# Patient Record
Sex: FEMALE | Race: WHITE | NOT HISPANIC OR LATINO
[De-identification: names, ages, dates, MRNs, and addresses within clinical notes are randomized per-mention and may not be internally consistent; named-entity substitution may affect disease eponyms.]

---

## 2019-08-27 ENCOUNTER — TELEPHONE (OUTPATIENT)
Dept: SCHEDULING | Age: 84
End: 2019-08-27

## 2019-09-09 ENCOUNTER — TELEPHONE (OUTPATIENT)
Dept: SCHEDULING | Age: 84
End: 2019-09-09

## 2019-10-21 ENCOUNTER — TELEPHONE (OUTPATIENT)
Dept: SCHEDULING | Age: 84
End: 2019-10-21

## 2019-10-25 ENCOUNTER — TELEPHONE (OUTPATIENT)
Dept: INTERNAL MEDICINE | Age: 84
End: 2019-10-25

## 2019-10-25 RX ORDER — ALENDRONATE SODIUM 70 MG/1
TABLET ORAL
Refills: 3 | COMMUNITY
Start: 2019-09-14 | End: 2019-10-25 | Stop reason: SDUPTHER

## 2019-10-25 RX ORDER — ALENDRONATE SODIUM 70 MG/1
70 TABLET ORAL
Qty: 12 TABLET | Refills: 3 | Status: SHIPPED | OUTPATIENT
Start: 2019-10-25

## 2019-10-29 PROBLEM — M81.0 OSTEOPOROSIS, POSTMENOPAUSAL: Status: ACTIVE | Noted: 2019-10-29

## 2019-10-30 ENCOUNTER — OFFICE VISIT (OUTPATIENT)
Dept: INTERNAL MEDICINE | Age: 84
End: 2019-10-30

## 2019-10-30 DIAGNOSIS — Z23 INFLUENZA VACCINE NEEDED: ICD-10-CM

## 2019-10-30 DIAGNOSIS — M15.9 GENERALIZED OSTEOARTHRITIS: ICD-10-CM

## 2019-10-30 DIAGNOSIS — I10 ESSENTIAL HYPERTENSION, BENIGN: ICD-10-CM

## 2019-10-30 DIAGNOSIS — M75.02 ADHESIVE CAPSULITIS OF LEFT SHOULDER: Primary | ICD-10-CM

## 2019-10-30 PROBLEM — M19.211: Status: ACTIVE | Noted: 2018-11-20

## 2019-10-30 PROBLEM — M19.212: Status: ACTIVE | Noted: 2018-11-20

## 2019-10-30 PROCEDURE — 99203 OFFICE O/P NEW LOW 30 MIN: CPT | Performed by: INTERNAL MEDICINE

## 2019-10-30 PROCEDURE — G0008 ADMIN INFLUENZA VIRUS VAC: HCPCS

## 2019-10-30 PROCEDURE — 90662 IIV NO PRSV INCREASED AG IM: CPT

## 2019-10-30 RX ORDER — HYDROCHLOROTHIAZIDE 12.5 MG/1
TABLET ORAL
Refills: 0 | COMMUNITY
Start: 2019-10-21

## 2019-10-30 RX ORDER — SENNOSIDES 8.6 MG
1300 CAPSULE ORAL 2 TIMES DAILY
Qty: 30 TABLET | COMMUNITY
Start: 2019-10-30 | End: 2019-10-30 | Stop reason: SDUPTHER

## 2019-10-30 RX ORDER — SENNOSIDES 8.6 MG
CAPSULE ORAL
Qty: 30 TABLET | COMMUNITY
Start: 2019-10-30

## 2019-10-30 RX ORDER — SENNOSIDES 8.6 MG
1300 CAPSULE ORAL
COMMUNITY
Start: 2017-04-13 | End: 2019-10-30 | Stop reason: SDUPTHER

## 2019-10-30 RX ORDER — MULTIVITAMIN
TABLET,CHEWABLE ORAL
COMMUNITY
Start: 2007-04-16

## 2019-10-30 RX ORDER — LOSARTAN POTASSIUM 100 MG/1
TABLET ORAL
Refills: 0 | COMMUNITY
Start: 2019-10-21

## 2019-10-30 ASSESSMENT — COGNITIVE AND FUNCTIONAL STATUS - GENERAL
DO YOU HAVE DIFFICULTY DRESSING OR BATHING: NO
BECAUSE OF A PHYSICAL, MENTAL, OR EMOTIONAL CONDITION, DO YOU HAVE DIFFICULTY DOING ERRANDS ALONE: NO
DO YOU HAVE SERIOUS DIFFICULTY WALKING OR CLIMBING STAIRS: YES
BECAUSE OF A PHYSICAL, MENTAL, OR EMOTIONAL CONDITION, DO YOU HAVE SERIOUS DIFFICULTY CONCENTRATING, REMEMBERING OR MAKING DECISIONS: NO

## 2019-10-30 ASSESSMENT — ENCOUNTER SYMPTOMS
PSYCHIATRIC NEGATIVE: 1
EYES NEGATIVE: 1
GASTROINTESTINAL NEGATIVE: 1
NEUROLOGICAL NEGATIVE: 1
RESPIRATORY NEGATIVE: 1
HEMATOLOGIC/LYMPHATIC NEGATIVE: 1
ENDOCRINE NEGATIVE: 1
CONSTITUTIONAL NEGATIVE: 1
ALLERGIC/IMMUNOLOGIC NEGATIVE: 1

## 2019-10-30 ASSESSMENT — PAIN SCALES - GENERAL: PAINLEVEL: 0

## 2019-11-06 ENCOUNTER — TELEPHONE (OUTPATIENT)
Dept: SCHEDULING | Age: 84
End: 2019-11-06

## 2021-05-26 VITALS
WEIGHT: 99 LBS | HEART RATE: 97 BPM | OXYGEN SATURATION: 95 % | TEMPERATURE: 99 F | RESPIRATION RATE: 16 BRPM | DIASTOLIC BLOOD PRESSURE: 82 MMHG | SYSTOLIC BLOOD PRESSURE: 126 MMHG | BODY MASS INDEX: 18.22 KG/M2 | HEIGHT: 62 IN

## 2024-07-16 ENCOUNTER — INITIAL APN SNF VISIT (OUTPATIENT)
Facility: SKILLED NURSING FACILITY | Age: 89
End: 2024-07-16

## 2024-07-16 VITALS
OXYGEN SATURATION: 95 % | RESPIRATION RATE: 18 BRPM | WEIGHT: 97.88 LBS | DIASTOLIC BLOOD PRESSURE: 54 MMHG | HEART RATE: 76 BPM | SYSTOLIC BLOOD PRESSURE: 100 MMHG | TEMPERATURE: 98 F

## 2024-07-16 DIAGNOSIS — E78.2 MIXED HYPERLIPIDEMIA: ICD-10-CM

## 2024-07-16 DIAGNOSIS — F03.90 DEMENTIA, UNSPECIFIED DEMENTIA SEVERITY, UNSPECIFIED DEMENTIA TYPE, UNSPECIFIED WHETHER BEHAVIORAL, PSYCHOTIC, OR MOOD DISTURBANCE OR ANXIETY (HCC): ICD-10-CM

## 2024-07-16 DIAGNOSIS — Z78.9 DECREASED ACTIVITIES OF DAILY LIVING (ADL): ICD-10-CM

## 2024-07-16 DIAGNOSIS — G25.0 TREMOR, ESSENTIAL: ICD-10-CM

## 2024-07-16 DIAGNOSIS — I10 PRIMARY HYPERTENSION: ICD-10-CM

## 2024-07-16 DIAGNOSIS — M19.90 OSTEOARTHRITIS, UNSPECIFIED OSTEOARTHRITIS TYPE, UNSPECIFIED SITE: ICD-10-CM

## 2024-07-16 DIAGNOSIS — R53.1 GENERALIZED WEAKNESS: Primary | ICD-10-CM

## 2024-07-16 PROCEDURE — 1123F ACP DISCUSS/DSCN MKR DOCD: CPT | Performed by: NURSE PRACTITIONER

## 2024-07-16 PROCEDURE — 99310 SBSQ NF CARE HIGH MDM 45: CPT | Performed by: NURSE PRACTITIONER

## 2024-07-16 RX ORDER — DONEPEZIL HYDROCHLORIDE 5 MG/1
5 TABLET, FILM COATED ORAL NIGHTLY
COMMUNITY

## 2024-07-16 RX ORDER — MULTIVIT-MIN/IRON/FOLIC ACID/K 18-600-40
2 CAPSULE ORAL DAILY
COMMUNITY

## 2024-07-16 RX ORDER — MIRTAZAPINE 15 MG/1
15 TABLET, FILM COATED ORAL NIGHTLY
COMMUNITY

## 2024-07-16 RX ORDER — ATORVASTATIN CALCIUM 10 MG/1
10 TABLET, FILM COATED ORAL DAILY
COMMUNITY

## 2024-07-16 RX ORDER — MULTIVIT-MIN/IRON FUM/FOLIC AC 7.5 MG-4
1 TABLET ORAL DAILY
COMMUNITY

## 2024-07-16 RX ORDER — LOSARTAN POTASSIUM 100 MG/1
100 TABLET ORAL DAILY
COMMUNITY

## 2024-07-16 RX ORDER — ALBUTEROL SULFATE 90 UG/1
2 AEROSOL, METERED RESPIRATORY (INHALATION) EVERY 4 HOURS PRN
COMMUNITY

## 2024-07-16 NOTE — PROGRESS NOTES
Florida Alvarado  : 1934  Age 89 year old  female patient is admitted to [facility] for rehabilitation and strengthening.      [facility] Admission 7/3/24 - 24    Chief complaint: Generalized weakness, impaired mobility 2/2 advanced age and recent UTI, ANA     HPI  [place] Alvarado is a 89-year-old female with a past medical history significant for dementia, HTN, HLD, essential tremor, and osteoarthritis. She was recently treated for ESBL Ecoli UTI s/p a 7 day course of Bactrim by her PCP on . She presented to [facility] with complaints of persistent fatigue. She was evaluated and treated for generalized weakness, impaired mobility secondary to advanced age and recent UTI, as well as ANA. CT brain was negative for acute intracranial abnormalities, revealing chronic atrophic and ischemic changes. She received IVF during her hospitalization. She was stabilized and discharged to [facility] for rehabilitation and strengthening.     Patient seen today for initial aprn visit. She was seen sitting up in her wheelchair in the dining area. She is pleasantly confused, alert and oriented to self only, without any complaints. Patient is a poor history d/t her dementia and unable to tell me who she lives with. Patient does report a poor appetite but is not able to tell me if she is sleeping well. Denies pain, lightheadedness, SOB, chest pain, abdominal pain, nausea, vomiting, urinary or bowel complaints. DW social work, it was reported by nursing that the patient screams at night. SW currently working on rehab transfer.     No past medical history on file.  No past surgical history on file.  No family history on file.  Social History     Socioeconomic History    Marital status: Single       IMMUNIZATIONS    There is no immunization history on file for this patient.     ALLERGIES:  No Known Allergies    CODE STATUS:  DNR    ADVANCED CARE PLANNING TEAM: Will need family care plan       CURRENT MEDICATIONS - reviewed  and updated on SNF EMAR       SUBJECTIVE  Patient seen today for initial aprn visit. She was seen sitting up in her wheelchair in the dining area. She is pleasantly confused, alert and oriented to self only, without any complaints. Patient is a poor history d/t her dementia and unable to tell me who she lives with. Patient does report a poor appetite but is not able to tell me if she is sleeping well. Denies pain, lightheadedness, SOB, chest pain, abdominal pain, nausea, vomiting, urinary or bowel complaints. DW social work, it was reported by nursing that the patient screams at night. SW currently working on rehab transfer to [facility].    PHYSICAL EXAM:  GENERAL HEALTH: frail, petite, in no acute distress  LINES, TUBES, DRAINS:  none  SKIN: no rashes, no suspicious lesions, thin fragile skin   WOUND: no wounds noted  EYES: PERRLA, EOMI, sclera anicteric, conjunctiva normal; there is no nystagmus, no drainage from eyes  HENT: normocephalic; normal nose, no nasal drainage, mucous membranes pink, moist, pharynx no exudate, no visible cerumen.   NECK:supple, FROM; no JVD, no TMG, no carotid bruits   BREAST: deferred exam   RESPIRATORY:clear to percussion and auscultation  CARDIOVASCULAR: S1, S2 normal, RRR; no S3, no S4  ABDOMEN:  normal active BS+, soft, nondistended; no organomegaly, no masses; no bruits; nontender, no guarding, no rebound tenderness.  :no suprapubic distension  LYMPHATIC:no lymphedema  MUSCULOSKELETAL: no acute synovitis upper or lower extremity   EXTREMITIES/VASCULAR:no cyanosis, clubbing or edema, radial pulses 2+, and dorsalis pedal pulses 2+  NEUROLOGIC:intact; no sensorimotor deficit and follows commands, generalized tremors   PSYCHIATRIC: alert and oriented x 1 to self only, pleasantly confused       MEDICAL DECISION MAKING  Not capable.   ERICA Alvarado     DIAGNOSTICS REVIEWED AT THIS VISIT:  [facility] medical records    SEE PLAN BELOW    Generalized weakness  Impaired mobility secondary to  advanced age and recent ESBL EColi UTI   - Recent UTI treated with Bactrim (culture showed sensitivity Bactrim). Repeat UA in hospital negative   - s/p IVF in hospital with no significant improvement   - CT brain - negative for acute process   - suspected that generalized weakness and impaired mobility likely r/t recent UTI and progression of dementia   - PT/OT  - Fall risk precautions  - monitor     ANA, pre renal - resolved   - 7/3 BUN 31, Cr 0.90  - s/p IVF  - CBC and BMP weekly and prn. Next labs Wednesday 7/18   - monitor    Dementia   - was on posey vest restraint during hospitalization   - at baseline alert and oriented x 1-2, appears end stage   - discussion with POA during hospitalization, agreeable to transition to hospice if no significant improvement at rehab   - continue remeron 15 HS  - continue aricept 2.5 mg HS x 4 weeks. EOT 8/9. And then increase to full dose Aricept 5 mg HS     HTN   - vitals q shift and prn   - SBP ranging 90s-120s  - continue losartan 100 daily. Parameters to hold SBP < 100   - monitor     HLD  - continue atorvastatin 10 daily     Osteoarthritis   Pain management  - continue Tylenol Arthritis ER 1300 mg q12hrs prn   - PT/OT    Vitamins  - continue multivitamin daily   - continue vitamin D3 daily     Discharge planning  - SW following  - family is asking for patient to be transferred  - referrals made      FOLLOW UP APPOINTMENTS  Follow up with PCP. Gladys DILLARD CNP on 7/18/24 at 2:00 pm    This is a 60 minute visit and greater than 50% of the time was spent counseling the patient and/or coordinating care.    Note to patient: The 21st Century Cures Act makes medical notes like these available to patients in the interest of transparency. However, this is a medical document intended as peer to peer communication. It is written in medical language and may contain abbreviations or verbiage that are unfamiliar. It may appear blunt or direct. Medical documents are intended  to carry relevant information, facts as evident, and the clinical opinion of the practitioner who signs the document.     NISHANT Krause  07/16/24